# Patient Record
Sex: MALE | Race: WHITE | Employment: STUDENT | URBAN - METROPOLITAN AREA
[De-identification: names, ages, dates, MRNs, and addresses within clinical notes are randomized per-mention and may not be internally consistent; named-entity substitution may affect disease eponyms.]

---

## 2017-06-27 ENCOUNTER — HISTORICAL (OUTPATIENT)
Dept: ADMINISTRATIVE | Facility: HOSPITAL | Age: 22
End: 2017-06-27

## 2017-07-06 ENCOUNTER — HISTORICAL (OUTPATIENT)
Dept: ADMINISTRATIVE | Facility: HOSPITAL | Age: 22
End: 2017-07-06

## 2017-07-20 ENCOUNTER — HISTORICAL (OUTPATIENT)
Dept: ADMINISTRATIVE | Facility: HOSPITAL | Age: 22
End: 2017-07-20

## 2017-08-17 ENCOUNTER — HISTORICAL (OUTPATIENT)
Dept: ADMINISTRATIVE | Facility: HOSPITAL | Age: 22
End: 2017-08-17

## 2022-04-10 ENCOUNTER — HISTORICAL (OUTPATIENT)
Dept: ADMINISTRATIVE | Facility: HOSPITAL | Age: 27
End: 2022-04-10

## 2022-04-26 VITALS
SYSTOLIC BLOOD PRESSURE: 130 MMHG | BODY MASS INDEX: 19.62 KG/M2 | DIASTOLIC BLOOD PRESSURE: 80 MMHG | HEIGHT: 67 IN | WEIGHT: 125 LBS

## 2022-05-02 NOTE — HISTORICAL [facility] CERNER
This is a historical note converted from Luan. Formatting and pictures may have been removed.  Please reference Luan for original formatting and attached multimedia. Chief Complaint  2 week fu rt hand fx 06/27/17 global 09/25/17  History of Present Illness  22 yo M returns for follow-up of R 5th MC boxers fracture.? He is being treated non-operatively in a splint currently.? Denies any complaints of pain or other issues.  Review of Systems  Denies fevers, chills, chest pain, shortness of breath, swelling.? Review of systems otherwise negative except as in HPI.  Physical Exam  Vitals & Measurements  BP:?130/80? HT:?170?cm? HT:?170?cm? WT:?56.7?kg? WT:?56.7?kg? BMI:?19.62?  R hand:  BCR distally  SILT distally  no TTP over 5th MT neck.? Flexion to mid palm.? no scissoring  Assessment/Plan  1.?Displaced fracture of neck of fifth metacarpal bone, right hand, initial encounter for closed fracture  ?Continued treatment in a splint.? Return in 3 weeks with repeat x-rays.  Ordered:  Clinic Follow up, *Est. 08/17/17 10:45:00 CDT, Order for future visit, Displaced fracture of neck of fifth metacarpal bone, right hand, initial encounter for closed fracture, [facility]  Post-Op follow-up visit [id number] PC, Displaced fracture of neck of fifth metacarpal bone, right hand, initial encounter for closed fracture, [facility], 07/20/17 14:51:00 CDT  XR Hand Right Minimum 3 Views, Routine, *Est. 08/17/17 3:00:00 CDT, Fracture, None, Ambulatory, Rad Type, Order for future visit, Displaced fracture of neck of fifth metacarpal bone, right hand, initial encounter for closed fracture, Not Scheduled, 4, week(s), In Approximately, *Es...  ?   Problem List/Past Medical History  No chronic problems  Historical  No historical problems  Procedure/Surgical History  Cholecystectomy, EGD, heart procedure.  Medications  No active medications  Allergies  No Known Allergies  Social History  Alcohol  Current, Beer, Wine, Liquor,  Daily  Employment/School  Student  Home/Environment  Lives with Alone.  Tobacco  Former smoker, Cigarettes  Diagnostic Results  AP/Lat/obl R hand reviewed - 5th MT neck fracture with 30 deg angulation.? no callous formation noted thus far.? Overall reduction maintained.

## 2022-05-02 NOTE — HISTORICAL [facility] CERNER
This is a historical note converted from Luan. Formatting and pictures may have been removed.  Please reference Luan for original formatting and attached multimedia. Chief Complaint  RIGHT HAND FRACTURE 6/27/17 GLOBAL 9/25/17 HERE TODAY FOR X-RAYS  History of Present Illness  29-year-old male presents for follow-up of?right fifth metacarpal boxers fracture. ?He is about 6 out from injury. ?He has no complaints of pain or other issues today. ?Full range of motion of his hand is returned to full activities.  Review of Systems  Denies fevers, chills, chest pain, shortness of breath. Review of systems otherwise negative except as in HPI.  Physical Exam  Vitals & Measurements  BP:?130/80? HT:?170?cm? HT:?170?cm? WT:?56.7?kg? WT:?56.7?kg? BMI:?19.62?  General: No acute distress, alert and oriented, healthy appearing?  HEENT: Head is atraumatic, mucous membranes are moist  Neck: Supples, no JVD  Cardiovascular: Palpable dorsalis pedis and posterior tibial pulses, regular rate and rhythm to those pulses  Lungs: Breathing non-labored  Skin: no rashes appreciated  Neurologic: Can flex and extend knees, ankles, and toes. Sensation is grossly intact  ?  Right hand:  No tenderness of his fifth metacarpal. ?Does have slight prominence near the fracture site. ?Full flexion of his hand. ?Cannot hyperextend his small finger.? No scissoring?or rotational deformity.  Assessment/Plan  1.?Displaced fracture of neck of fifth metacarpal bone, right hand, initial encounter for closed fracture  Patient with well healed fifth metacarpal fracture. ?In return on as-needed basis. ?Patient may resume normal activities?as tolerated.  Ordered:  Post-Op follow-up visit [id number] PC, Displaced fracture of neck of fifth metacarpal bone, right hand, initial encounter for closed fracture, [facility], 08/17/17 11:31:00 CDT  ?  Orders:  Clinic Follow-up PRN, 08/17/17 11:31:00 CDT, Future Order, [facility]   Problem List/Past Medical  History  No chronic problems  Historical  No historical problems  Procedure/Surgical History  Cholecystectomy, EGD, heart procedure.  Medications  No active medications  Allergies  No Known Allergies  Social History  Alcohol  Current, Beer, Wine, Liquor, Daily  Employment/School  Student  Home/Environment  Lives with Alone.  Tobacco  Former smoker, Cigarettes  Diagnostic Results  AP lateral right hand taken and reviewed. ?Fracture well healed and well aligned.

## 2022-05-02 NOTE — HISTORICAL [facility] CERNER
This is a historical note converted from Luan. Formatting and pictures may have been removed.  Please reference Luan for original formatting and attached multimedia. Chief Complaint  Hit a wall last night with his rt hand. Did not go to ER. Painful and swollen  History of Present Illness  22 yo M presents after injury to R hand.? Patient hit wall yesterday.? Complaining of ongoing pain to R lateral hand.? Otherwise without complaints.? No radiations.? no other issues currently  Review of Systems  Denies fevers, chills, chest pain, shortness of breath, swelling.? Review of systems otherwise negative except as in HPI.  Physical Exam  Vitals & Measurements  HT:?170?cm? HT:?170?cm? WT:?56.7?kg? WT:?56.7?kg? BMI:?19.62?  General: No acute distress, alert and oriented, healthy appearing  HEENT: Head is atraumatic, mucous membranes are moist  Neck: Supples, no JVD  Cardiovascular: Palpable dorsalis pedis and posterior tibial pulses, regular rate and rhythm to those pulses  Lungs: Breathing non-labored  Skin: no rashes appreciated  Neurologic: Can flex and extend knees, ankles, and toes.? Sensation is grossly intact  ?  R hand:  swelling to 4th and 5th MT.? SILT distally to M/R/U distributions  BCR distally to fingers  2+ radial pulses  TTP over 5th MT neck  Assessment/Plan  1.?Displaced fracture of neck of fifth metacarpal bone, right hand, initial encounter for closed fracture  ?Patient with mildly displaced metacarpal neck fracture.? Will perform closed reduction and place in cast.? Return in 1 week for x-rays R hand in cast to monitor displacement.  Ordered:  Cast sup sht arm adult fbrgl  , 06/27/17 16:30:00 CDT, [facility], Routine, 06/27/17 16:30:00 CDT  Clinic Follow up, *Est. 07/06/17 13:15:00 CDT, Order for future visit, Displaced fracture of neck of fifth metacarpal bone, right hand, initial encounter for closed fracture, [facility]  Forearm - james cast , 06/27/17 16:30:00 CDT,  [facility], Routine, 06/27/17 16:30:00 CDT  Metacarpal single fx - ortho fx w/[facility], 06/27/17 16:37:00 CDT, [facility], Routine, 06/27/17 16:37:00 CDT  Office/Outpatient Visit Level 3 New [id number] PC, Displaced fracture of neck of fifth metacarpal bone, right hand, initial encounter for closed fracture, [facility], 06/27/17 16:03:00 CDT  Office/Outpatient Visit Level 3 New [id number] PC, Displaced fracture of neck of fifth metacarpal bone, right hand, initial encounter for closed fracture, [facility], 06/27/17 16:30:00 CDT  Office/Outpatient Visit Level 3 New [id number] PC, Displaced fracture of neck of fifth metacarpal bone, right hand, initial encounter for closed fracture, [facility], 06/27/17 16:30:00 CDT  XR Hand Right Minimum 3 Views, Routine, *Est. 07/04/17 3:00:00 CDT, Fracture, None, Ambulatory, Rad Type, Order for future visit, Displaced fracture of neck of fifth metacarpal bone, right hand, initial encounter for closed fracture, Not Scheduled, 1, week(s), In Approximately, *Es...  ?  Right hand pain  Ordered:  XR Hand Right Minimum 3 Views, Routine, 06/27/17 15:19:00 CDT, Pain, None, Ambulatory, Rad Type, Right hand pain, Not Scheduled, 06/27/17 15:19:00 CDT  ?   Problem List/Past Medical History  No chronic problems  Historical  No historical problems  Procedure/Surgical History  Cholecystectomy, EGD, heart procedure.  Medications  No active medications  Allergies  No Known Allergies  Social History  Alcohol  Current, Beer, Wine, Liquor, Daily  Employment/School  Student  Home/Environment  Lives with Alone.  Tobacco  Former smoker, Cigarettes  Diagnostic Results  3 views R hand show 5th MT neck fracture with 20-30 deg volar angulation

## 2022-05-02 NOTE — HISTORICAL [facility] CERNER
This is a historical note converted from Luan. Formatting and pictures may have been removed.  Please reference Luan for original formatting and attached multimedia. Chief Complaint  1 week fu fjor rt hand fx 06/27/17 global 09/25/17  History of Present Illness  21-year-old male returns today for follow-up of?right fifth metacarpal boxers fracture. ?He is overall doing very well. ?He was placed in a cast?10 days ago. ?He is otherwise without complaints to his right hand.  Review of Systems  Denies fevers, chills, chest pain, shortness of breath. Review of systems otherwise negative except as in HPI.  Physical Exam  Vitals & Measurements  BP:?130/80? HT:?170?cm? HT:?170?cm? WT:?56.7?kg? WT:?56.7?kg? BMI:?19.62?  Right hand:  Sensation intact distally. ?Cast in?disrepair. ?It has been removed. ?Minimal ?tenderness to palpation of his fifth metatarsal head.? No significant deformity. ?Swelling improved.? No rotatory instability. ?Near full flexion of his small finger  Assessment/Plan  1.?Displaced fracture of neck of fifth metacarpal bone, right hand, initial encounter for closed fracture  Patient boxers fracture his fifth metacarpal. ?We will continue treat this nonoperatively.? We removed his cast?plan to place him in a boxers fracture splint.? Given a prescription for this. ?Plan to see him back in 2 weeks?repeat x-rays at that time.  Ordered:  Clinic Follow up, *Est. 07/20/17 14:15:00 CDT, Order for future visit, Displaced fracture of neck of fifth metacarpal bone, right hand, initial encounter for closed fracture, [facility]  External Referral, R boxers fracture, boxers fracture splint to R hand, 07/06/17 13:44:00 CDT, Displaced fracture of neck of fifth metacarpal bone, right hand, initial encounter for closed fracture  Post-Op follow-up visit [id number] PC, Displaced fracture of neck of fifth metacarpal bone, right hand, initial encounter for closed fracture, [facility], 07/06/17 13:44:00  CDT  XR Hand Right Minimum 3 Views, Routine, *Est. 07/20/17 3:00:00 CDT, Fracture, None, Ambulatory, Rad Type, Order for future visit, Displaced fracture of neck of fifth metacarpal bone, right hand, initial encounter for closed fracture, Not Scheduled, 2, week(s), In Approximately, *Es...  ?   Problem List/Past Medical History  No chronic problems  Historical  No historical problems  Procedure/Surgical History  Cholecystectomy, EGD, heart procedure.  Medications  No active medications  Allergies  No Known Allergies  Social History  Alcohol  Current, Beer, Wine, Liquor, Daily  Employment/School  Student  Home/Environment  Lives with Alone.  Tobacco  Former smoker, Cigarettes  Diagnostic Results  AP lateral right hand taken today and reviewed. ?Patients fracture alignment maintained.? About 30??flexion deformity?at his metacarpal neck fracture.